# Patient Record
Sex: FEMALE | Race: WHITE | NOT HISPANIC OR LATINO | Employment: OTHER | URBAN - METROPOLITAN AREA
[De-identification: names, ages, dates, MRNs, and addresses within clinical notes are randomized per-mention and may not be internally consistent; named-entity substitution may affect disease eponyms.]

---

## 2023-03-19 NOTE — PROGRESS NOTES
Assessment/Plan:    Encounter for gynecological examination (general) (routine) without abnormal findings  All well, no complaints  Normal breast and pelvic exams  Last pap 2018 neg/HPV neg, no further indicated, recommendations reviewed with pt  Mammo order given, last 10/4/22 BIRADS 1B reviewed on [employer]  Colonoscopy  , due   Dexa  21 forearm T-1 9, hip T-0 8, orders given today for 2023         Diagnoses and all orders for this visit:    Encounter for gynecological examination (general) (routine) without abnormal findings    Encounter for screening mammogram for breast cancer  -     Mammo screening bilateral w 3d & cad; Future    Osteopenia of right forearm  -     DXA bone density spine hip and pelvis; Future    Other orders  -     Liquid-based pap, screening          Subjective:      Patient ID: Mary Kumar is a 81 y o  female  HPI Here for Medicare biannual breast and pelvic exams  The following portions of the patient's history were reviewed and updated as appropriate:   She  has a past medical history of Colon polyps and Spinal stenosis  She   Patient Active Problem List    Diagnosis Date Noted   • Encounter for gynecological examination (general) (routine) without abnormal findings 2023     She  has a past surgical history that includes Colonoscopy ();  section; Back surgery (); Cataract extraction (); Spinal fusion (); and Glaucoma surgery ()  Her family history is not on file  She  reports that she has never smoked  She has never used smokeless tobacco  She reports that she does not currently use alcohol  She reports that she does not use drugs  No current outpatient medications on file  No current facility-administered medications for this visit  She is allergic to acetazolamide       Review of Systems  No PMB, breast, bladder, bowel changes   No new persistent pain, bloating, early satiety or pelvic pressure      Objective:      /76   Ht 5' 2 5" (1 588 m)   Wt 64 9 kg (143 lb)   BMI 25 74 kg/m²          Physical Exam    General appearance: no distress, pleasant  Neck: thyroid without nodules or thyromegaly, no palpable adenopathy  Lymph nodes: no palpable adenopathy  Breasts: no masses, nodes or skin changes  Abdomen: soft, non tender, no palpable masses  Pelvic exam: normal atrophic external genitalia, urethral meatus normal, vagina atrophic without lesions, cervix atrophic without lesions, uterus small, non tender, no adnexal masses, non tender  Rectal exam: normal sphincter tone, no masses, RV confirms above

## 2023-03-20 ENCOUNTER — ANNUAL EXAM (OUTPATIENT)
Dept: OBGYN CLINIC | Facility: CLINIC | Age: 73
End: 2023-03-20

## 2023-03-20 VITALS
HEIGHT: 63 IN | DIASTOLIC BLOOD PRESSURE: 76 MMHG | WEIGHT: 143 LBS | BODY MASS INDEX: 25.34 KG/M2 | SYSTOLIC BLOOD PRESSURE: 124 MMHG

## 2023-03-20 DIAGNOSIS — Z01.419 ENCOUNTER FOR GYNECOLOGICAL EXAMINATION (GENERAL) (ROUTINE) WITHOUT ABNORMAL FINDINGS: Primary | ICD-10-CM

## 2023-03-20 DIAGNOSIS — M85.831 OSTEOPENIA OF RIGHT FOREARM: ICD-10-CM

## 2023-03-20 DIAGNOSIS — Z12.31 ENCOUNTER FOR SCREENING MAMMOGRAM FOR BREAST CANCER: ICD-10-CM

## 2023-03-20 NOTE — PATIENT INSTRUCTIONS
Return to office in two years unless having any problems such as breast changes, bleeding, new persistent pain, new progressive bloating, new problems eating (getting full to quickly) or new constant urinary pressure that does not resolve in one week      Call in September of 2024 to schedule your March 2025 visit

## 2023-03-20 NOTE — LETTER
2023     [address] [place] [address] [address]    Patient: Cande Pardo   YOB: 1950   Date of Visit: 3/20/2023       Dear Dr Carlin Kumar: Thank you for referring Donny Campuzano to me for evaluation  Below are my notes for this consultation  If you have questions, please do not hesitate to call me  I look forward to following your patient along with you  Sincerely,        George Sorensen MD        CC: No Recipients  George Sorensen MD  3/20/2023  2:09 PM  Sign when Signing Visit  Assessment/Plan:    Encounter for gynecological examination (general) (routine) without abnormal findings  All well, no complaints  Normal breast and pelvic exams  Last pap 2018 neg/HPV neg, no further indicated, recommendations reviewed with pt  Mammo order given, last 10/4/22 BIRADS 1B reviewed on [employer]  Colonoscopy  , due   Dexa  21 forearm T-1 9, hip T-0 8, orders given today for 2023        Diagnoses and all orders for this visit:    Encounter for gynecological examination (general) (routine) without abnormal findings    Encounter for screening mammogram for breast cancer  -     Mammo screening bilateral w 3d & cad; Future    Osteopenia of right forearm  -     DXA bone density spine hip and pelvis; Future    Other orders  -     Liquid-based pap, screening         Subjective:     Patient ID: Cande Pardo is a 81 y o  female  HPI Here for Medicare biannual breast and pelvic exams  The following portions of the patient's history were reviewed and updated as appropriate:   She  has a past medical history of Colon polyps and Spinal stenosis  She   Patient Active Problem List    Diagnosis Date Noted   • Encounter for gynecological examination (general) (routine) without abnormal findings 2023     She  has a past surgical history that includes Colonoscopy ();  section; Back surgery (); Cataract extraction ();  Spinal fusion (2016); and Glaucoma surgery (2014)  Her family history is not on file  She  reports that she has never smoked  She has never used smokeless tobacco  She reports that she does not currently use alcohol  She reports that she does not use drugs  No current outpatient medications on file  No current facility-administered medications for this visit  She is allergic to acetazolamide       Review of Systems No PMB, breast, bladder, bowel changes   No new persistent pain, bloating, early satiety or pelvic pressure      Objective:      /76   Ht 5' 2 5" (1 588 m)   Wt 64 9 kg (143 lb)   BMI 25 74 kg/m²         Physical Exam   General appearance: no distress, pleasant  Neck: thyroid without nodules or thyromegaly, no palpable adenopathy  Lymph nodes: no palpable adenopathy  Breasts: no masses, nodes or skin changes  Abdomen: soft, non tender, no palpable masses  Pelvic exam: normal atrophic external genitalia, urethral meatus normal, vagina atrophic without lesions, cervix atrophic without lesions, uterus small, non tender, no adnexal masses, non tender  Rectal exam: normal sphincter tone, no masses, RV confirms above

## 2023-03-20 NOTE — ASSESSMENT & PLAN NOTE
All well, no complaints  Normal breast and pelvic exams  Last pap 8/2018 neg/HPV neg, no further indicated, recommendations reviewed with pt    Mammo order given, last 10/4/22 BIRADS 1B reviewed on [employer]  Colonoscopy  2020, due 2025  Dexa  9/30/21 forearm T-1 9, hip T-0 8, orders given today for 9/2023

## 2023-10-25 DIAGNOSIS — M85.831 OSTEOPENIA OF RIGHT FOREARM: ICD-10-CM

## 2023-10-26 ENCOUNTER — TELEPHONE (OUTPATIENT)
Dept: OBGYN CLINIC | Facility: CLINIC | Age: 73
End: 2023-10-26

## 2023-10-26 DIAGNOSIS — Z12.31 ENCOUNTER FOR SCREENING MAMMOGRAM FOR BREAST CANCER: ICD-10-CM

## 2023-10-26 NOTE — TELEPHONE ENCOUNTER
Please call pt with stable osteopenia on dexa, low risk of hip fracture. Continue to strive for 1200 mg of calcium and 1000 IU of vitamin D daily in diet and supplements combined for your bone health. You can only absorb 600 mg of calcium at a time. Avoid excess calcium as this may adversely effect your heart. There are 400 mg of calcium in an 8 oz serving of dairy. Repeat Dexa in 2 years.    Thanks

## 2023-10-26 NOTE — TELEPHONE ENCOUNTER
Spoke with patient and informed f stable Osteopenia on Dexa, low risk of hip fracture. Advised to strive for 1200 mg of calcium and 1000 IU of vitamin D daily in diet and supplements combined. You can only absorb 600 mg of calcium at a time. Avoid excess calcium as this may adversely effect your heart. Example provided informing there are 400 mg of calcium in an 8 oz serving of dairy. Plan is to repeat Dexa in 2 years.

## 2024-02-21 PROBLEM — Z01.419 ENCOUNTER FOR GYNECOLOGICAL EXAMINATION (GENERAL) (ROUTINE) WITHOUT ABNORMAL FINDINGS: Status: RESOLVED | Noted: 2023-03-20 | Resolved: 2024-02-21

## 2025-03-29 NOTE — ASSESSMENT & PLAN NOTE
Here for well check, no breast or gyn concerns  Normal breast and pelvic exams.  Last pap 2018 neg/HPV neg; no further indicated  Mammo order given, last 10/14/24 BIRADS 2B, reviewed on [employer]  Colonoscopy 2020, scheduled 6/2025  Dexa 10/2023 Stable forearm, T-2.0, 5% decline in left hip T-1.2 and fem neck T-1.0. Ten year hip fx risk 1%. Repeat 5-7 years, not interested in medication.

## 2025-03-29 NOTE — PROGRESS NOTES
Name: Leydi Mcmahon      : 1950      MRN: [id number]  Encounter Provider: Nicolette Rousseau MD  Encounter Date: 3/31/2025   Encounter department: [facility] [place]  :  Assessment & Plan  Encounter for gynecological examination (general) (routine) without abnormal findings  Here for well check, no breast or gyn concerns  Normal breast and pelvic exams.  Last pap  neg/HPV neg; no further indicated  Mammo order given, last 10/14/24 BIRADS 2B, reviewed on [employer]  Colonoscopy , scheduled 2025  Dexa 10/2023 Stable forearm, T-2.0, 5% decline in left hip T-1.2 and fem neck T-1.0. Ten year hip fx risk 1%. Repeat 5-7 years, not interested in medication.       Encounter for screening mammogram for malignant neoplasm of breast    Orders:    Mammo screening bilateral w 3d and cad; Future        History of Present Illness   HPI  Leydi Mcmahon is a 74 y.o. female who presents for Medicare biannual breast and pelvic exams.    Review of Systems No PMB, breast, bladder, bowel changes. No new persistent pain, bloating, early satiety or pelvic pressure    Past Medical History   Past Medical History:   Diagnosis Date    Colon polyps     Spinal stenosis      Past Surgical History:   Procedure Laterality Date    BACK SURGERY      CATARACT EXTRACTION       SECTION      2    COLONOSCOPY  2020    GLAUCOMA SURGERY      SPINAL FUSION       Family History   Problem Relation Age of Onset    Uterine cancer Neg Hx     Ovarian cancer Neg Hx     Colon cancer Neg Hx     Breast cancer Neg Hx     Hip fracture Neg Hx     Thrombosis Neg Hx       reports that she has never smoked. She has never used smokeless tobacco. She reports that she does not currently use alcohol. She reports that she does not use drugs.  Current Outpatient Medications   Medication Instructions    Biotin 5,000 mcg, Oral, Daily    Calcium Carb-Cholecalciferol (CALCIUM 500 + D PO) 1,200 mg,  "Oral, Daily    Cholecalciferol 25 MCG (1000 UT) CHEW Oral    Coenzyme Q10 100 mg, Oral, Daily     mg, Oral, Daily    Fish Oil Omega-3 2 g, Oral, Daily    folic acid (FOLVITE) 400 mcg, Oral, Daily    GARLIC PO Oral    Ibuprofen 200 mg, Oral    Magnesium 360 mg, Oral, 2 times daily    prednisoLONE acetate (PRED FORTE) 1 % ophthalmic suspension Ophthalmic for 40 Days    Red Yeast Rice Extract 600 MG CAPS Oral    Vitamin E 400 Units, Oral, Daily     Allergies   Allergen Reactions    Acetazolamide Rash         Objective   Ht 5' 3\" (1.6 m)   Wt 66.2 kg (146 lb)   BMI 25.86 kg/m²      Physical Exam  General appearance: no distress, pleasant  Neck: thyroid without nodules or thyromegaly, no palpable adenopathy  Lymph nodes: no palpable adenopathy  Breasts: no masses, nodes or skin changes  Abdomen: soft, non tender, no palpable masses  Pelvic exam: normal atrophic external genitalia, urethral meatus normal, vagina atrophic without lesions, cervix atrophic without lesions, uterus small, non tender, no adnexal masses, non tender  Rectal exam: normal sphincter tone, no masses, RV confirms above  "

## 2025-03-31 ENCOUNTER — ANNUAL EXAM (OUTPATIENT)
Dept: OBGYN CLINIC | Facility: CLINIC | Age: 75
End: 2025-03-31
Payer: MEDICARE

## 2025-03-31 VITALS
WEIGHT: 146 LBS | HEIGHT: 63 IN | BODY MASS INDEX: 25.87 KG/M2 | SYSTOLIC BLOOD PRESSURE: 130 MMHG | DIASTOLIC BLOOD PRESSURE: 66 MMHG

## 2025-03-31 DIAGNOSIS — Z12.31 ENCOUNTER FOR SCREENING MAMMOGRAM FOR MALIGNANT NEOPLASM OF BREAST: ICD-10-CM

## 2025-03-31 DIAGNOSIS — Z01.419 ENCOUNTER FOR GYNECOLOGICAL EXAMINATION (GENERAL) (ROUTINE) WITHOUT ABNORMAL FINDINGS: Primary | ICD-10-CM

## 2025-03-31 PROCEDURE — G0101 CA SCREEN;PELVIC/BREAST EXAM: HCPCS | Performed by: OBSTETRICS & GYNECOLOGY

## 2025-03-31 RX ORDER — NICOTINE POLACRILEX 2 MG
5000 GUM BUCCAL DAILY
COMMUNITY

## 2025-03-31 RX ORDER — PREDNISOLONE ACETATE 10 MG/ML
SUSPENSION/ DROPS OPHTHALMIC
COMMUNITY

## 2025-03-31 RX ORDER — FOLIC ACID 0.8 MG
400 TABLET ORAL DAILY
COMMUNITY

## 2025-03-31 RX ORDER — PNV NO.95/FERROUS FUM/FOLIC AC 28MG-0.8MG
2 TABLET ORAL DAILY
COMMUNITY

## 2025-03-31 RX ORDER — PSEUDOEPHEDRINE HCL 30 MG
250 TABLET ORAL DAILY
COMMUNITY

## 2025-03-31 RX ORDER — CYANOCOBALAMIN (VITAMIN B-12) 500 MCG
400 LOZENGE ORAL DAILY
COMMUNITY

## 2025-03-31 RX ORDER — CRANBERRY FRUIT EXTRACT 200 MG
CAPSULE ORAL
COMMUNITY

## 2025-03-31 RX ORDER — OMEGA-3 FATTY ACIDS/FISH OIL 300-1000MG
200 CAPSULE ORAL
COMMUNITY

## 2025-03-31 NOTE — LETTER
2025     Karma De Leon DO  [address] [facility] [address]    Patient: Leydi Mcmahon   YOB: 1950   Date of Visit: 3/31/2025       Dear Dr. De Leon:    Thank you for referring Leydi Mcmahon to me for evaluation. Below are my notes for this consultation.    If you have questions, please do not hesitate to call me. I look forward to following your patient along with you.         Sincerely,        Nicolette Rousseau MD        CC: No Recipients    Nicolette Rousseau MD  3/31/2025 12:58 PM  Sign when Signing Visit  Name: Leydi Mcmahon      : 1950      MRN: [id number]  Encounter Provider: Nicolette Rousseau MD  Encounter Date: 3/31/2025   Encounter department: [facility] [place]  :  Assessment & Plan  Encounter for gynecological examination (general) (routine) without abnormal findings  Here for well check, no breast or gyn concerns  Normal breast and pelvic exams.  Last pap  neg/HPV neg; no further indicated  Mammo order given, last 10/14/24 BIRADS 2B, reviewed on [employer]  Colonoscopy , scheduled 2025  Dexa 10/2023 Stable forearm, T-2.0, 5% decline in left hip T-1.2 and fem neck T-1.0. Ten year hip fx risk 1%. Repeat 5-7 years, not interested in medication.       Encounter for screening mammogram for malignant neoplasm of breast    Orders:  •  Mammo screening bilateral w 3d and cad; Future        History of Present Illness  HPI  Leydi Mcmahon is a 74 y.o. female who presents for Medicare biannual breast and pelvic exams.    Review of Systems No PMB, breast, bladder, bowel changes. No new persistent pain, bloating, early satiety or pelvic pressure    Past Medical History  Past Medical History:   Diagnosis Date   • Colon polyps    • Spinal stenosis      Past Surgical History:   Procedure Laterality Date   • BACK SURGERY     • CATARACT EXTRACTION     •  SECTION      2   • COLONOSCOPY      Due    • GLAUCOMA SURGERY    "  • SPINAL FUSION  2016     Family History   Problem Relation Age of Onset   • Uterine cancer Neg Hx    • Ovarian cancer Neg Hx    • Colon cancer Neg Hx    • Breast cancer Neg Hx    • Hip fracture Neg Hx    • Thrombosis Neg Hx       reports that she has never smoked. She has never used smokeless tobacco. She reports that she does not currently use alcohol. She reports that she does not use drugs.  Current Outpatient Medications   Medication Instructions   • Biotin 5,000 mcg, Oral, Daily   • Calcium Carb-Cholecalciferol (CALCIUM 500 + D PO) 1,200 mg, Oral, Daily   • Cholecalciferol 25 MCG (1000 UT) CHEW Oral   • Coenzyme Q10 100 mg, Oral, Daily   •  mg, Oral, Daily   • Fish Oil Omega-3 2 g, Oral, Daily   • folic acid (FOLVITE) 400 mcg, Oral, Daily   • GARLIC PO Oral   • Ibuprofen 200 mg, Oral   • Magnesium 360 mg, Oral, 2 times daily   • prednisoLONE acetate (PRED FORTE) 1 % ophthalmic suspension Ophthalmic for 40 Days   • Red Yeast Rice Extract 600 MG CAPS Oral   • Vitamin E 400 Units, Oral, Daily     Allergies   Allergen Reactions   • Acetazolamide Rash         Objective  Ht 5' 3\" (1.6 m)   Wt 66.2 kg (146 lb)   BMI 25.86 kg/m²      Physical Exam  General appearance: no distress, pleasant  Neck: thyroid without nodules or thyromegaly, no palpable adenopathy  Lymph nodes: no palpable adenopathy  Breasts: no masses, nodes or skin changes  Abdomen: soft, non tender, no palpable masses  Pelvic exam: normal atrophic external genitalia, urethral meatus normal, vagina atrophic without lesions, cervix atrophic without lesions, uterus small, non tender, no adnexal masses, non tender  Rectal exam: normal sphincter tone, no masses, RV confirms above    "

## 2025-03-31 NOTE — PATIENT INSTRUCTIONS
Return to office in two years unless having any problems such as breast changes, bleeding, new persistent pain, new progressive bloating, new problems eating (getting full to quickly) or new constant urinary pressure that does not resolve in one week.    Kwame your April 2026 calendar to call and schedule the 2027 visit.

## 2025-07-23 ENCOUNTER — TELEPHONE (OUTPATIENT)
Age: 75
End: 2025-07-23

## 2025-07-24 ENCOUNTER — OFFICE VISIT (OUTPATIENT)
Age: 75
End: 2025-07-24
Payer: MEDICARE

## 2025-07-24 VITALS — HEIGHT: 64 IN | BODY MASS INDEX: 24.41 KG/M2 | WEIGHT: 143 LBS

## 2025-07-24 DIAGNOSIS — M17.11 PRIMARY OSTEOARTHRITIS OF RIGHT KNEE: Primary | ICD-10-CM

## 2025-07-24 PROCEDURE — 99213 OFFICE O/P EST LOW 20 MIN: CPT | Performed by: PHYSICIAN ASSISTANT

## 2025-07-24 NOTE — PROGRESS NOTES
"Orthopaedic Surgery - Office Note  Leydi Mcmahon (75 y.o. female)   : 1950   MRN: [id number]  Encounter Date: 2025    Assessment & Plan  Primary osteoarthritis of right knee  Pt returns to the office today in f/u of her R knee.  She reports good relief from prior injection and although there is an effusion on exam today, she is not experiencing any pain.  We decided to hold off on any intervention today.  Pt will continue with her activities as able and f/u PRN.  Pt was agreeable and all questions answered.  She will call with questions.         Follow-up:  No follow-ups on file.      Chief Complaint / Date of Onset  F/u R knee      History of Present Illness   Leydi Mcmahon is a 75 y.o. female who presents for f/u of her R knee.  Here for clinical check.  Reports good relief from prior injection.  Continues to be able to perform her activities without any difficulty.  Does not feel that she needs another injection today.        Review of Systems  Pertinent items are noted in HPI.  All other systems were reviewed and are negative.      Physical Exam  Ht 5' 4\" (1.626 m)   Wt 64.9 kg (143 lb)   BMI 24.55 kg/m²   Cons: Appears well.  No apparent distress.  Psych: Alert. Oriented x3.  Mood and affect normal.  Eyes: PERRLA, EOMI  Resp: Normal effort.  No audible wheezing or stridor.  CV: Palpable pulse.  No discernable arrhythmia.  No LE edema.  Lymph:  No palpable cervical, axillary, or inguinal lymphadenopathy.  Skin: Warm.  No palpable masses.  No visible lesions.  Neuro: Normal muscle tone.  Normal and symmetric DTR's.     R knee without trauma or deformity.  + palpable effusion.  (-) TTP.  + painfree ROM.  Strength WNL.  NVI distally.  No calf TTP.  Remainder of exam is unremarkable.      Studies Reviewed  No studies to review      Procedures  No procedures today.    Medical, Surgical, Family, and Social History  The patient's medical history, family history, and social history, were " reviewed and updated as appropriate.    Past Medical History[1]    Past Surgical History[2]    Family History[3]    Social History     Occupational History    Not on file   Tobacco Use    Smoking status: Never     Passive exposure: Never    Smokeless tobacco: Never   Vaping Use    Vaping status: Never Used   Substance and Sexual Activity    Alcohol use: Not Currently    Drug use: Never    Sexual activity: Not on file       Allergies[4]    Current Medications[5]      Connie Hernandez PA-C       [1]   Past Medical History:  Diagnosis Date    Colon polyps     Spinal stenosis    [2]   Past Surgical History:  Procedure Laterality Date    BACK SURGERY  1985    CATARACT EXTRACTION       SECTION      2     SECTION N/A     X2    COLONOSCOPY      Due     EYE SURGERY N/A     GLAUCOMA SURGERY      LUMBAR FUSION N/A     L4-5  2016    SPINAL FUSION  2016    SPINAL FUSION N/A     SCOLIOSIS  1086   [3]   Family History  Problem Relation Name Age of Onset    Hyperlipidemia Mother      Alzheimer's disease Mother      Hypertension Father      Cancer Brother          MAL LAST PROSTATE    Leukemia Brother      Other (CEREBROVASCULAR ACCIDENT) Other GRANDFATHER     Uterine cancer Neg Hx      Ovarian cancer Neg Hx      Colon cancer Neg Hx      Breast cancer Neg Hx      Hip fracture Neg Hx      Thrombosis Neg Hx     [4]   Allergies  Allergen Reactions    Acetazolamide Rash   [5]   Current Outpatient Medications:     Biotin 1 MG CAPS, Take 5,000 mcg by mouth daily, Disp: , Rfl:     Calcium Carb-Cholecalciferol (CALCIUM 500 + D PO), Take 1,200 mg by mouth daily, Disp: , Rfl:     Cholecalciferol 25 MCG (1000 UT) CHEW, Chew, Disp: , Rfl:     Coenzyme Q10 10 MG capsule, Take 100 mg by mouth daily, Disp: , Rfl:     Docusate Sodium (DSS) 250 MG CAPS, Take 250 mg by mouth daily, Disp: , Rfl:     folic acid (FOLVITE) 800 MCG tablet, Take 400 mcg by mouth daily, Disp: , Rfl:     GARLIC PO, Take by mouth, Disp: , Rfl:      Ibuprofen 200 MG CAPS, Take 200 mg by mouth, Disp: , Rfl:     Magnesium 100 MG CAPS, Take 360 mg by mouth 2 (two) times a day, Disp: , Rfl:     Omega-3 Fatty Acids (Fish Oil Omega-3) 1000 MG CAPS, Take 2 g by mouth daily, Disp: , Rfl:     prednisoLONE acetate (PRED FORTE) 1 % ophthalmic suspension, Ophthalmic for 40 Days, Disp: , Rfl:     Red Yeast Rice Extract 600 MG CAPS, Take by mouth, Disp: , Rfl:     Vitamin E 400 units TABS, Take 400 Units by mouth daily, Disp: , Rfl: